# Patient Record
Sex: MALE | Race: WHITE | Employment: OTHER | RURAL
[De-identification: names, ages, dates, MRNs, and addresses within clinical notes are randomized per-mention and may not be internally consistent; named-entity substitution may affect disease eponyms.]

---

## 2018-03-22 ENCOUNTER — OFFICE VISIT (OUTPATIENT)
Dept: FAMILY MEDICINE CLINIC | Age: 28
End: 2018-03-22

## 2018-03-22 VITALS
HEART RATE: 69 BPM | DIASTOLIC BLOOD PRESSURE: 78 MMHG | HEIGHT: 68 IN | RESPIRATION RATE: 14 BRPM | TEMPERATURE: 97.7 F | OXYGEN SATURATION: 99 % | BODY MASS INDEX: 21.67 KG/M2 | SYSTOLIC BLOOD PRESSURE: 118 MMHG | WEIGHT: 143 LBS

## 2018-03-22 DIAGNOSIS — J06.9 URI WITH COUGH AND CONGESTION: Primary | ICD-10-CM

## 2018-03-22 RX ORDER — AZITHROMYCIN 250 MG/1
TABLET, FILM COATED ORAL
Qty: 6 TAB | Refills: 0 | Status: SHIPPED | OUTPATIENT
Start: 2018-03-22 | End: 2018-06-19 | Stop reason: ALTCHOICE

## 2018-03-22 NOTE — MR AVS SNAPSHOT
[facility] 
[phone] Patient: Desire Bonilla MRN: [id number] [facility] [place]:[id number]/66/0645 Visit Information Date & Time Provider Department Dept. Phone Encounter #  
 3/22/2018 10:30 AM Alexandra Gonsales PA-C [address] [id number] Follow-up Instructions Return if symptoms worsen or fail to improve. Upcoming Health Maintenance Date Due Influenza Age 5 to Adult 8/1/2017 DTaP/Tdap/Td series (2 - Td) 6/23/2023 Allergies as of 3/22/2018  Review Complete On: 3/22/2018 By: Alexandra Gonsales PA-C No Known Allergies Current Immunizations  Never Reviewed Name Date Tdap 6/23/2013 12:29 AM  
  
 Not reviewed this visit You Were Diagnosed With   
  
 Codes Comments URI with cough and congestion    -  Primary ICD-10-CM: J06.9 ICD-9-CM: 465.9 Vitals BP Pulse Temp Resp Height(growth percentile) Weight(growth percentile) 118/78 (BP 1 Location: Left arm, BP Patient Position: Sitting) 69 97.7 °F (36.5 °C) (Oral) 14 5' 8\" (1.727 m) 143 lb (64.9 kg) SpO2 BMI Smoking Status 99% 21.74 kg/m2 Never Smoker BMI and BSA Data Body Mass Index Body Surface Area 21.74 kg/m 2 1.76 m 2 Preferred Pharmacy Pharmacy Name Phone [facility] [address] [place] [address], [address] [place] [address] Your Updated Medication List  
  
   
This list is accurate as of 3/22/18 11:06 AM.  Always use your most recent med list.  
  
  
  
  
 azithromycin 250 mg tablet Commonly known as:  Lupe Rogue Take 2 tablets today, then take 1 tablet daily HYDROcodone-acetaminophen 5-500 mg per tablet Commonly known as:  Alli Gank Take 1 Tab by mouth every four (4) hours as needed for Pain. ibuprofen 200 mg tablet Commonly known as:  MOTRIN Take 400 mg by mouth. raNITIdine 150 mg tablet Commonly known as:  ZANTAC Take 150 mg by mouth as needed. ZANAFLEX 6 mg capsule Generic drug:  tiZANidine Take 6 mg by mouth three (3) times daily. Prescriptions Sent to Pharmacy Refills  
 azithromycin (ZITHROMAX) 250 mg tablet 0 Sig: Take 2 tablets today, then take 1 tablet daily Class: Normal  
 Pharmacy: [facility] [address] [place] [address] 3 & 33  #: [phone] Follow-up Instructions Return if symptoms worsen or fail to improve. Patient Instructions Upper Respiratory Infection (Cold): Care Instructions Your Care Instructions An upper respiratory infection, or URI, is an infection of the nose, sinuses, or throat. URIs are spread by coughs, sneezes, and direct contact. The common cold is the most frequent kind of URI. The flu and sinus infections are other kinds of URIs. Almost all URIs are caused by viruses. Antibiotics won't cure them. But you can treat most infections with home care. This may include drinking lots of fluids and taking over-the-counter pain medicine. You will probably feel better in 4 to 10 days. The doctor has checked you carefully, but problems can develop later. If you notice any problems or new symptoms, get medical treatment right away. Follow-up care is a key part of your treatment and safety. Be sure to make and go to all appointments, and call your doctor if you are having problems. It's also a good idea to know your test results and keep a list of the medicines you take. How can you care for yourself at home? · To prevent dehydration, drink plenty of fluids, enough so that your urine is light yellow or clear like water. Choose water and other caffeine-free clear liquids until you feel better. If you have kidney, heart, or liver disease and have to limit fluids, talk with your doctor before you increase the amount of fluids you drink. · Take an over-the-counter pain medicine, such as acetaminophen (Tylenol), ibuprofen (Advil, Motrin), or naproxen (Aleve). Read and follow all instructions on the label. · Before you use cough and cold medicines, check the label. These medicines may not be safe for young children or for people with certain health problems. · Be careful when taking over-the-counter cold or flu medicines and Tylenol at the same time. Many of these medicines have acetaminophen, which is Tylenol. Read the labels to make sure that you are not taking more than the recommended dose. Too much acetaminophen (Tylenol) can be harmful. · Get plenty of rest. 
· Do not smoke or allow others to smoke around you. If you need help quitting, talk to your doctor about stop-smoking programs and medicines. These can increase your chances of quitting for good. When should you call for help? Call 911 anytime you think you may need emergency care. For example, call if: 
? · You have severe trouble breathing. ?Call your doctor now or seek immediate medical care if: 
? · You seem to be getting much sicker. ? · You have new or worse trouble breathing. ? · You have a new or higher fever. ? · You have a new rash. ? Watch closely for changes in your health, and be sure to contact your doctor if: 
? · You have a new symptom, such as a sore throat, an earache, or sinus pain. ? · You cough more deeply or more often, especially if you notice more mucus or a change in the color of your mucus. ? · You do not get better as expected. Where can you learn more? Go to http://christiano-edgar.info/. Enter T551 in the search box to learn more about \"Upper Respiratory Infection (Cold): Care Instructions. \" Current as of: May 12, 2017 Content Version: 11.4 © 7052-3310 [employer].  Care instructions adapted under license by [employer] (which disclaims liability or warranty for this information). If you have questions about a medical condition or this instruction, always ask your healthcare professional. [address] any warranty or liability for your use of this information. Introducing hospitals & [facility] SERVICES! Elma Mariano introduces Shnergle patient portal. Now you can access parts of your medical record, email your doctor's office, and request medication refills online. 1. In your internet browser, go to [email or web address] 2. Click on the First Time User? Click Here link in the Sign In box. You will see the New Member Sign Up page. 3. Enter your Shnergle Access Code exactly as it appears below. You will not need to use this code after youve completed the sign-up process. If you do not sign up before the expiration date, you must request a new code. · Shnergle Access Code: [email or web address] Expires: 6/20/2018 11:06 AM 
 
4. Enter the last four digits of your Social Security Number (xxxx) and Date of Birth (mm/dd/yyyy) as indicated and click Submit. You will be taken to the next sign-up page. 5. Create a Shnergle ID. This will be your Shnergle login ID and cannot be changed, so think of one that is secure and easy to remember. 6. Create a Shnergle password. You can change your password at any time. 7. Enter your Password Reset Question and Answer. This can be used at a later time if you forget your password. 8. Enter your e-mail address. You will receive e-mail notification when new information is available in [address]. 9. Click Sign Up. You can now view and download portions of your medical record. 10. Click the Download Summary menu link to download a portable copy of your medical information. If you have questions, please visit the Frequently Asked Questions section of the Shnergle website. Remember, Shnergle is NOT to be used for urgent needs. For medical emergencies, dial 911. Now available from your iPhone and Android! Please provide this summary of care documentation to your next provider. Your primary care clinician is listed as Marian Le. If you have any questions after today's visit, please call [phone].

## 2018-03-22 NOTE — PROGRESS NOTES
Angelina Bravo is a 25 y.o. male who presents to the office today with the following:  Chief Complaint   Patient presents with    Sinus Infection     pt c/o head congestion and chest congestion X4 days, productive cough w/yellow mucus       HPI  Chest congestion x 4 days. \"does not feel like a cold, those don't usually get me down\"  Energy low, appetite decent. Coughing up yellow mucus, clear phlegm from nose. Heavy sinus pressure, but no pain. Does not think has sinus infection. Throat is a little sore, but w/o painful swallowing. No fever, HA, neck pain, ear pain, tooth pain, or body aches. No sick contacts or recent travel. Feeling a little better today, but worried will worse over weekend. Tried Vicks cold/flu - a little temporary relief yesterday. Review of Systems   Constitutional: Positive for malaise/fatigue. Negative for chills, diaphoresis and fever. HENT: Positive for congestion. Negative for ear pain, sinus pain and sore throat. Respiratory: Positive for cough. Negative for shortness of breath and wheezing. Cardiovascular: Negative for chest pain. Genitourinary: Negative. Musculoskeletal: Negative for myalgias. Skin: Negative for rash. Neurological: Negative for dizziness and headaches. See HPI. History reviewed. No pertinent past medical history. History reviewed. No pertinent surgical history. No Known Allergies    Current Outpatient Prescriptions   Medication Sig    azithromycin (ZITHROMAX) 250 mg tablet Take 2 tablets today, then take 1 tablet daily    ranitidine (ZANTAC) 150 mg tablet Take 150 mg by mouth as needed.  ibuprofen (MOTRIN) 200 mg tablet Take 400 mg by mouth.  tiZANidine (ZANAFLEX) 6 mg capsule Take 6 mg by mouth three (3) times daily.  HYDROcodone-acetaminophen (LORTAB) 5-500 mg per tablet Take 1 Tab by mouth every four (4) hours as needed for Pain. No current facility-administered medications for this visit.         Social History Social History    Marital status:      Spouse name: N/A    Number of children: N/A    Years of education: N/A     Social History Main Topics    Smoking status: Never Smoker    Smokeless tobacco: Never Used    Alcohol use Yes      Comment: occassional    Drug use: No    Sexual activity: Not Asked     Other Topics Concern    None     Social History Narrative       Family History   Problem Relation Age of Onset    No Known Problems Mother     No Known Problems Father     No Known Problems Sister     No Known Problems Brother     No Known Problems Sister          Physical Exam:  Visit Vitals    /78 (BP 1 Location: Left arm, BP Patient Position: Sitting)    Pulse 69    Temp 97.7 °F (36.5 °C) (Oral)    Resp 14    Ht 5' 8\" (1.727 m)    Wt 143 lb (64.9 kg)    SpO2 99%    BMI 21.74 kg/m2     Physical Exam   Constitutional: He is oriented to person, place, and time and well-developed, well-nourished, and in no distress. Vital signs are normal.   HENT:   Head: Normocephalic and atraumatic. Right Ear: External ear normal.   Left Ear: External ear normal.   Mouth/Throat: Oropharynx is clear and moist.   Eyes: Conjunctivae are normal.   Neck: Normal range of motion. Neck supple. Cardiovascular: Normal rate, regular rhythm and normal heart sounds. Pulmonary/Chest: Effort normal. No accessory muscle usage. No respiratory distress. He has no decreased breath sounds. He has no wheezes. He has no rhonchi. Very cortney crackles at bilat bases, but good air exchange, no wheeze/rhonchi or other findings. No increase effort, speaking in full sentences. Abdominal: Soft. There is no tenderness. Lymphadenopathy:     He has cervical adenopathy (few scattered ant cerv nodes, small, mobile, nonttp). Neurological: He is alert and oriented to person, place, and time. Gait normal.   Skin: Skin is warm and dry.    Psychiatric: Mood and affect normal.   Nursing note and vitals reviewed. Assessment/Plan:    ICD-10-CM ICD-9-CM    1. URI with cough and congestion J06.9 465.9 azithromycin (ZITHROMAX) 250 mg tablet     Pt feeling better, may hold a few more days then take abx if does not continue to improve. Counseled pt on potential medication AEs/interactions. Encourage rest & fluids. Discussed otc medications for symptomatic relief. RTO if sxs persist/worsen or develops any additional sxs/concerns. Seek immediate care/to ER for any \"red flag\" sxs. Follow-up Disposition:  Return if symptoms worsen or fail to improve.     Glenna Alfred PA-C

## 2018-03-22 NOTE — PATIENT INSTRUCTIONS
Upper Respiratory Infection (Cold): Care Instructions  Your Care Instructions    An upper respiratory infection, or URI, is an infection of the nose, sinuses, or throat. URIs are spread by coughs, sneezes, and direct contact. The common cold is the most frequent kind of URI. The flu and sinus infections are other kinds of URIs. Almost all URIs are caused by viruses. Antibiotics won't cure them. But you can treat most infections with home care. This may include drinking lots of fluids and taking over-the-counter pain medicine. You will probably feel better in 4 to 10 days. The doctor has checked you carefully, but problems can develop later. If you notice any problems or new symptoms, get medical treatment right away. Follow-up care is a key part of your treatment and safety. Be sure to make and go to all appointments, and call your doctor if you are having problems. It's also a good idea to know your test results and keep a list of the medicines you take. How can you care for yourself at home? · To prevent dehydration, drink plenty of fluids, enough so that your urine is light yellow or clear like water. Choose water and other caffeine-free clear liquids until you feel better. If you have kidney, heart, or liver disease and have to limit fluids, talk with your doctor before you increase the amount of fluids you drink. · Take an over-the-counter pain medicine, such as acetaminophen (Tylenol), ibuprofen (Advil, Motrin), or naproxen (Aleve). Read and follow all instructions on the label. · Before you use cough and cold medicines, check the label. These medicines may not be safe for young children or for people with certain health problems. · Be careful when taking over-the-counter cold or flu medicines and Tylenol at the same time. Many of these medicines have acetaminophen, which is Tylenol. Read the labels to make sure that you are not taking more than the recommended dose.  Too much acetaminophen (Tylenol) can be harmful. · Get plenty of rest.  · Do not smoke or allow others to smoke around you. If you need help quitting, talk to your doctor about stop-smoking programs and medicines. These can increase your chances of quitting for good. When should you call for help? Call 911 anytime you think you may need emergency care. For example, call if:  ? · You have severe trouble breathing. ?Call your doctor now or seek immediate medical care if:  ? · You seem to be getting much sicker. ? · You have new or worse trouble breathing. ? · You have a new or higher fever. ? · You have a new rash. ? Watch closely for changes in your health, and be sure to contact your doctor if:  ? · You have a new symptom, such as a sore throat, an earache, or sinus pain. ? · You cough more deeply or more often, especially if you notice more mucus or a change in the color of your mucus. ? · You do not get better as expected. Where can you learn more? Go to http://christiano-edgar.info/. Enter D931 in the search box to learn more about \"Upper Respiratory Infection (Cold): Care Instructions. \"  Current as of: May 12, 2017  Content Version: 11.4  © 2470-2293 [employer]. Care instructions adapted under license by [employer] (which disclaims liability or warranty for this information). If you have questions about a medical condition or this instruction, always ask your healthcare professional. [place] [address] any warranty or liability for your use of this information.

## 2018-03-22 NOTE — PROGRESS NOTES
Chief Complaint   Patient presents with    Sinus Infection     pt c/o head congestion and chest congestion X4 days, productive cough w/yellow mucus     Pt also fatigued, no energy    Health Maintenance Due   Topic Date Due    Influenza Age 5 to Adult  08/01/2017     Visit Vitals    /78 (BP 1 Location: Left arm, BP Patient Position: Sitting)    Pulse 69    Temp 97.7 °F (36.5 °C) (Oral)    Resp 14    Ht 5' 8\" (1.727 m)    Wt 143 lb (64.9 kg)    SpO2 99%    BMI 21.74 kg/m2     Henrietta Hamilton LPN

## 2018-06-19 ENCOUNTER — OFFICE VISIT (OUTPATIENT)
Dept: FAMILY MEDICINE CLINIC | Age: 28
End: 2018-06-19

## 2018-06-19 VITALS
SYSTOLIC BLOOD PRESSURE: 118 MMHG | RESPIRATION RATE: 16 BRPM | WEIGHT: 140.2 LBS | TEMPERATURE: 97.5 F | DIASTOLIC BLOOD PRESSURE: 75 MMHG | HEART RATE: 75 BPM | HEIGHT: 68 IN | BODY MASS INDEX: 21.25 KG/M2 | OXYGEN SATURATION: 97 %

## 2018-06-19 DIAGNOSIS — R59.0 INGUINAL LYMPHADENOPATHY: Primary | ICD-10-CM
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
## 2018-06-19 LAB
BILIRUB UR QL STRIP: NEGATIVE
GLUCOSE UR-MCNC: NEGATIVE MG/DL
KETONES P FAST UR STRIP-MCNC: NEGATIVE MG/DL
PH UR STRIP: 6 [PH] (ref 4.6–8)
PROT UR QL STRIP: NEGATIVE
SP GR UR STRIP: 1.01 (ref 1–1.03)
UA UROBILINOGEN AMB POC: NORMAL (ref 0.2–1)
URINALYSIS CLARITY POC: CLEAR
URINALYSIS COLOR POC: NORMAL
URINE BLOOD POC: NEGATIVE
URINE LEUKOCYTES POC: NEGATIVE
URINE NITRITES POC: NEGATIVE

## 2018-06-19 RX ORDER — ASCORBIC ACID 500 MG
1000 TABLET ORAL
COMMUNITY
End: 2022-08-03

## 2018-06-19 NOTE — PROGRESS NOTES
Danika López is a 25 y.o. male who presents to the office today with the following:  Chief Complaint   Patient presents with    Mass     swollen lymph node right inguinal area X3 weeks       HPI  Noticed a swollen lymph node in his right groin ~3 weeks ago. Unsure of what caused, is outside a lot and did have some tick bites on one of his LEs about 1 wk prior. Swelling has gone down since onset, but \"still there and feels a little hard\"  No longer as sore/tender. After bending a lot does aggravate right leg, ant thigh. Otherwise no injury he is aware of. Is not sure of any other cause, no rash/wounds/sxs of infection. Has had no recent illness, no n/v/d/abd pain or other GI sxs, also denies any  sxs. Is  in monogamous relationship and has no concerns of any STDs. Says this happened to upper back twice before, had swollen lymph nodes all around neck/armpit after infected tick bite to that area, and again after working with [employer] before (says his lymph nodes right neck have remained feeling a little \"stiff\" since those flare ups vs left). Pt notes this also occurred in inguinal region when he was younger, thought just puberty. Things resolved their own. Otherwise feeling well with no other complaints or acute concerns. Denies any systemic illness sxs. No other glands feel swollen. Review of Systems   Constitutional: Negative for chills, diaphoresis, fever, malaise/fatigue and weight loss. HENT: Negative. Eyes: Negative. Respiratory: Negative for cough and shortness of breath. Cardiovascular: Negative for chest pain. Gastrointestinal: Negative. Negative for abdominal pain, blood in stool, constipation, diarrhea, melena, nausea and vomiting. Genitourinary: Negative. Musculoskeletal: Negative for back pain, joint pain and myalgias. Skin: Negative for itching and rash. Neurological: Negative. Negative for dizziness, weakness and headaches. See HPI.     History reviewed. No pertinent past medical history. History reviewed. No pertinent surgical history. No Known Allergies    Current Outpatient Prescriptions   Medication Sig    ascorbic acid, vitamin C, (VITAMIN C) 500 mg tablet Take 1,000 mg by mouth.  ibuprofen (MOTRIN) 200 mg tablet Take 400 mg by mouth.  ranitidine (ZANTAC) 150 mg tablet Take 150 mg by mouth as needed.  tiZANidine (ZANAFLEX) 6 mg capsule Take 6 mg by mouth three (3) times daily.  HYDROcodone-acetaminophen (LORTAB) 5-500 mg per tablet Take 1 Tab by mouth every four (4) hours as needed for Pain. No current facility-administered medications for this visit. Social History     Social History    Marital status:      Spouse name: N/A    Number of children: N/A    Years of education: N/A     Social History Main Topics    Smoking status: Never Smoker    Smokeless tobacco: Never Used    Alcohol use Yes      Comment: occassional    Drug use: No    Sexual activity: Yes     Partners: Female     Birth control/ protection: None, Pill     Other Topics Concern    None     Social History Narrative       Family History   Problem Relation Age of Onset    No Known Problems Mother     No Known Problems Father     No Known Problems Sister     No Known Problems Brother     No Known Problems Sister          Physical Exam:  Visit Vitals    /75 (BP 1 Location: Left arm, BP Patient Position: Sitting)    Pulse 75    Temp 97.5 °F (36.4 °C) (Oral)    Resp 16    Ht 5' 8\" (1.727 m)    Wt 140 lb 3.2 oz (63.6 kg)    SpO2 97%    BMI 21.32 kg/m2     Physical Exam   Constitutional: He is oriented to person, place, and time and well-developed, well-nourished, and in no distress. Thin WM. HENT:   Head: Normocephalic and atraumatic. Right Ear: External ear normal.   Left Ear: External ear normal.   Eyes: Conjunctivae and EOM are normal.   Neck: Normal range of motion. Neck supple.    Cardiovascular: Normal rate, regular rhythm, normal heart sounds and intact distal pulses. Pulmonary/Chest: Effort normal and breath sounds normal.   Abdominal: Soft. He exhibits no distension and no mass. There is no tenderness. There is no rebound and no guarding. Genitourinary: Testes/scrotum normal and penis normal. No discharge found. Musculoskeletal: Normal range of motion. He exhibits no edema, tenderness or deformity. Lymphadenopathy:     He has no cervical adenopathy. He has no axillary adenopathy. Right: Inguinal (upper right region ~2cm, soft mobile and nonttp. left inguinal nodes are also easily palpable but do not feel enlarged and are mobile and nontt, <1cm) adenopathy present. No supraclavicular and no epitrochlear adenopathy present. Left: No inguinal, no supraclavicular and no epitrochlear adenopathy present. No head adenopathy. Neurological: He is alert and oriented to person, place, and time. Gait normal.   Skin: Skin is warm and dry. No rash noted. Psychiatric: Mood and affect normal.   Nursing note and vitals reviewed. Assessment/Plan:    ICD-10-CM ICD-9-CM    1. Inguinal lymphadenopathy R59.0 785.6 AMB POC URINALYSIS DIP STICK MANUAL W/O MICRO   2. Tick bite, initial encounter W57. XXXA 919.4 CBC WITH AUTOMATED DIFF     E906.4 LYME AB/WESTERN BLOT REFLEX     Results for orders placed or performed in visit on 06/19/18   AMB POC URINALYSIS DIP STICK MANUAL W/O MICRO   Result Value Ref Range    Color (UA POC) Light Yellow     Clarity (UA POC) Clear     Glucose (UA POC) Negative Negative    Bilirubin (UA POC) Negative Negative    Ketones (UA POC) Negative Negative    Specific gravity (UA POC) 1.010 1.001 - 1.035    Blood (UA POC) Negative Negative    pH (UA POC) 6.0 4.6 - 8.0    Protein (UA POC) Negative Negative    Urobilinogen (UA POC) 0.2 mg/dL 0.2 - 1    Nitrites (UA POC) Negative Negative    Leukocyte esterase (UA POC) Negative Negative     Discussed various potential etiologies.  Work-up pending. Pt notes improvement already, he will cont to monitor his sxs and f/u in 2 wks for recheck. If no improvement/worsening may proceed with some imaging, pt agrees would like to hold off for now and see results/monitor first. Will seek care/rtc in interim for any new or worsening sxs. Follow-up Disposition:  Return in about 2 weeks (around 7/3/2018), or sooner if symptoms worsen or dev new sxs.     Angel Luis Peters PA-C

## 2018-06-19 NOTE — PROGRESS NOTES
Chief Complaint   Patient presents with    Mass     swollen lymph node right inguinal area X3 weeks     There are no preventive care reminders to display for this patient. Visit Vitals    /75 (BP 1 Location: Left arm, BP Patient Position: Sitting)    Pulse 75    Temp 97.5 °F (36.4 °C) (Oral)    Resp 16    Ht 5' 8\" (1.727 m)    Wt 140 lb 3.2 oz (63.6 kg)    SpO2 97%    BMI 21.32 kg/m2     1. Have you been to the ER, urgent care clinic since your last visit? Hospitalized since your last visit? No    2. Have you seen or consulted any other health care providers outside of the [address] [place] [address] since your last visit? Include any pap smears or colon screening.  No    Zahraa Grewal LPN

## 2018-06-19 NOTE — PATIENT INSTRUCTIONS
Swollen Lymph Nodes: Care Instructions  Your Care Instructions    Lymph nodes are small, bean-shaped glands throughout the body. They help your body fight germs and infections. Lymph nodes often swell when there is a problem such as an injury, infection, or tumor. · The nodes in your neck, under your chin, or behind your ears may swell when you have a cold or sore throat. · An injury or infection in a leg or foot can make the nodes in your groin swell. · Sometimes medicine can make lymph nodes swell, but this is rare. Treatment depends on what caused your nodes to swell. Usually the nodes return to normal size without a problem. Follow-up care is a key part of your treatment and safety. Be sure to make and go to all appointments, and call your doctor if you are having problems. It's also a good idea to know your test results and keep a list of the medicines you take. How can you care for yourself at home? · Take your medicines exactly as prescribed. Call your doctor if you think you are having a problem with your medicine. · Avoid irritation. ¨ Do not squeeze or pick at the lump. ¨ Do not stick a needle in it. · Prevent infection. Do not squeeze, drain, or puncture a painful lump. Doing this can irritate or inflame the lump, push any existing infection deeper into the skin, or cause severe bleeding. · Get extra rest. Slow down just a little from your usual routine. · Drink plenty of fluids, enough so that your urine is light yellow or clear like water. If you have kidney, heart, or liver disease and have to limit fluids, talk with your doctor before you increase the amount of fluids you drink. · Take an over-the-counter pain medicine, such as acetaminophen (Tylenol), ibuprofen (Advil, Motrin), or naproxen (Aleve). Read and follow all instructions on the label. · Do not take two or more pain medicines at the same time unless the doctor told you to.  Many pain medicines have acetaminophen, which is Tylenol. Too much acetaminophen (Tylenol) can be harmful. When should you call for help? Call your doctor now or seek immediate medical care if:  ? · You have worse symptoms of infection, such as:  ¨ Increased pain, swelling, warmth, or redness. ¨ Red streaks leading from the area. ¨ Pus draining from the area. ¨ A fever. ? Watch closely for changes in your health, and be sure to contact your doctor if:  ? · Your lymph nodes do not get smaller or do not return to normal.   ? · You do not get better as expected. Where can you learn more? Go to http://christiano-edgar.info/. Enter I608 in the search box to learn more about \"Swollen Lymph Nodes: Care Instructions. \"  Current as of: March 3, 2017  Content Version: 11.4  © 2274-0978 [employer]. Care instructions adapted under license by [employer] (which disclaims liability or warranty for this information). If you have questions about a medical condition or this instruction, always ask your healthcare professional. [place] [address] any warranty or liability for your use of this information. Tick Bite: Care Instructions  Your Care Instructions    Ticks are small spiderlike animals. They bite to fasten themselves onto your skin and feed on your blood. Ticks can carry diseases. But most ticks do not carry diseases, and most tick bites do not cause serious health problems. Some people may have an allergic reaction to a tick bite. This reaction may be mild, with symptoms like itching and swelling. In rare cases, a severe allergic reaction may occur. Most of the time, all you need to do for a tick bite is relieve any symptoms you may have. Follow-up care is a key part of your treatment and safety. Be sure to make and go to all appointments, and call your doctor if you are having problems. It's also a good idea to know your test results and keep a list of the medicines you take.   How can you care for yourself at home? · Put ice or a cold pack on the bite for 15 to 20 minutes once an hour. Put a thin cloth between the ice and your skin. · Try an over-the-counter medicine to relieve itching, redness, swelling, and pain. Be safe with medicines. Read and follow all instructions on the label. ¨ Take an antihistamine medicine, such as a nondrowsy one like loratadine (Claritin) or one that might make you sleepy like diphenhydramine (Benadryl). These medicines may help relieve itching, redness, and swelling. ¨ Use a spray of local anesthetic that contains benzocaine, such as Solarcaine. It may help relieve pain. If your skin reacts to the spray, stop using it. ¨ Put calamine lotion on the skin. It may help relieve itching. To avoid tick bites  · Avoid ticks:  ¨ Learn where ticks are found in your community, and stay away from those areas if possible. ¨ Cover as much of your body as possible when you work or play in grassy or wooded areas. ¨ Use insect repellents, such as products containing DEET. You can spray them on your skin. ¨ Take steps to control ticks on your property if you live in an area where Lyme disease occurs. Clear leaves, brush, tall grasses, woodpiles, and stone fences from around your house and the edges of your yard or garden. This may help get rid of ticks. · When you come in from outdoors, check your body for ticks, including your groin, head, and underarms. The ticks may be about the size of a sesame seed. If no one else can help you check for ticks on your scalp, comb your hair with a fine-tooth comb. · If you find a tick, remove it quickly. Use tweezers to grasp the tick as close to its mouth (the part in your skin) as possible. Slowly pull the tick straight out-do not twist or yank-until its mouth releases from your skin. · Ticks can come into your house on clothing, outdoor gear, and pets. These ticks can fall off and attach to you. ¨ Check your clothing and outdoor gear.  Remove any ticks you find. Then put your clothing in a clothes dryer on high heat for 1 hour to kill any ticks that might remain. ¨ Check your pets for ticks after they have been outdoors. · When hiking in the woods, carry a small dry jar or ziplock bag. If you find a tick on your body, remove the tick and put it in the jar or bag. Store the container in the freezer so you can give it to your doctor if symptoms develop. The tick can be tested to learn whether it is carrying the bacteria that cause Lyme disease. When should you call for help? Call 911 anytime you think you may need emergency care. For example, call if:  ? · You have symptoms of a severe allergic reaction. These may include:  ¨ Sudden raised, red areas (hives) all over your body. ¨ Swelling of the throat, mouth, lips, or tongue. ¨ Trouble breathing. ¨ Passing out (losing consciousness). Or you may feel very lightheaded or suddenly feel weak, confused, or restless. ?Call your doctor now or seek immediate medical care if:  ? · You have signs of infection, such as:  ¨ Increased pain, swelling, warmth, or redness around the bite. ¨ Red streaks leading from the bite. ¨ Pus draining from the bite. ¨ A fever. ? Watch closely for changes in your health, and be sure to contact your doctor if:  ? · You develop a new rash. ? · You have joint pain. ? · You are very tired. ? · You have flu-like symptoms. ? · You have symptoms for more than 1 week. Where can you learn more? Go to http://christiano-edgar.info/. Enter R455 in the search box to learn more about \"Tick Bite: Care Instructions. \"  Current as of: March 20, 2017  Content Version: 11.4  © 0950-5519 [employer]. Care instructions adapted under license by [employer] (which disclaims liability or warranty for this information).  If you have questions about a medical condition or this instruction, always ask your healthcare professional. [employer] disclaims any warranty or liability for your use of this information.

## 2018-06-20 LAB
B BURGDOR IGG+IGM SER-ACNC: <0.91 ISR (ref 0–0.9)
B BURGDOR IGM SER IA-ACNC: <0.8 INDEX (ref 0–0.79)
BASOPHILS # BLD AUTO: 0 X10E3/UL (ref 0–0.2)
BASOPHILS NFR BLD AUTO: 1 %
EOSINOPHIL # BLD AUTO: 0.4 X10E3/UL (ref 0–0.4)
EOSINOPHIL NFR BLD AUTO: 9 %
ERYTHROCYTE [DISTWIDTH] IN BLOOD BY AUTOMATED COUNT: 13.9 % (ref 12.3–15.4)
HCT VFR BLD AUTO: 44.4 % (ref 37.5–51)
HGB BLD-MCNC: 15.1 G/DL (ref 13–17.7)
IMM GRANULOCYTES # BLD: 0 X10E3/UL (ref 0–0.1)
IMM GRANULOCYTES NFR BLD: 0 %
LYMPHOCYTES # BLD AUTO: 1.3 X10E3/UL (ref 0.7–3.1)
LYMPHOCYTES NFR BLD AUTO: 31 %
MCH RBC QN AUTO: 30.4 PG (ref 26.6–33)
MCHC RBC AUTO-ENTMCNC: 34 G/DL (ref 31.5–35.7)
MCV RBC AUTO: 90 FL (ref 79–97)
MONOCYTES # BLD AUTO: 0.3 X10E3/UL (ref 0.1–0.9)
MONOCYTES NFR BLD AUTO: 7 %
NEUTROPHILS # BLD AUTO: 2.2 X10E3/UL (ref 1.4–7)
NEUTROPHILS NFR BLD AUTO: 52 %
PLATELET # BLD AUTO: 252 X10E3/UL (ref 150–379)
RBC # BLD AUTO: 4.96 X10E6/UL (ref 4.14–5.8)
WBC # BLD AUTO: 4.3 X10E3/UL (ref 3.4–10.8)

## 2018-07-06 ENCOUNTER — OFFICE VISIT (OUTPATIENT)
Dept: FAMILY MEDICINE CLINIC | Age: 28
End: 2018-07-06

## 2018-07-06 VITALS
SYSTOLIC BLOOD PRESSURE: 131 MMHG | HEIGHT: 68 IN | BODY MASS INDEX: 20.34 KG/M2 | TEMPERATURE: 97.7 F | HEART RATE: 69 BPM | OXYGEN SATURATION: 97 % | DIASTOLIC BLOOD PRESSURE: 73 MMHG | RESPIRATION RATE: 14 BRPM | WEIGHT: 134.2 LBS

## 2018-07-06 DIAGNOSIS — R59.0 INGUINAL LYMPHADENOPATHY: Primary | ICD-10-CM

## 2018-07-06 NOTE — PROGRESS NOTES
Chief Complaint   Patient presents with    Mass     2 wk f/u swollen gland     There are no preventive care reminders to display for this patient. Visit Vitals    /73 (BP 1 Location: Left arm, BP Patient Position: Sitting)    Pulse 69    Temp 97.7 °F (36.5 °C) (Oral)    Resp 14    Ht 5' 8\" (1.727 m)    Wt 134 lb 3.2 oz (60.9 kg)    SpO2 97%    BMI 20.41 kg/m2     1. Have you been to the ER, urgent care clinic since your last visit? Hospitalized since your last visit? No    2. Have you seen or consulted any other health care providers outside of the [address] [place] [address] since your last visit? Include any pap smears or colon screening.  No    Shakira LOVE Pfeiffer

## 2018-07-06 NOTE — PROGRESS NOTES
Sagrario Poole is a 25 y.o. male who presents to the office today with the following:  Chief Complaint   Patient presents with    Mass     2 wk f/u swollen gland       HPI  Here for f/u inguinal lymphadenopathy. Says is no longer tender. Has gone down in size, but still feels \"longer\" than other nodes. Has not had any new sxs or noticed any other swollen glands or masses. Otherwise feeling well with no other complaints or acute concerns. ROS  See HPI. Past Medical History:   Diagnosis Date    Inguinal adenopathy        History reviewed. No pertinent surgical history. No Known Allergies    Current Outpatient Prescriptions   Medication Sig    ascorbic acid, vitamin C, (VITAMIN C) 500 mg tablet Take 1,000 mg by mouth.  ibuprofen (MOTRIN) 200 mg tablet Take 400 mg by mouth.  ranitidine (ZANTAC) 150 mg tablet Take 150 mg by mouth as needed.  tiZANidine (ZANAFLEX) 6 mg capsule Take 6 mg by mouth three (3) times daily.  HYDROcodone-acetaminophen (LORTAB) 5-500 mg per tablet Take 1 Tab by mouth every four (4) hours as needed for Pain. No current facility-administered medications for this visit.         Social History     Social History    Marital status:      Spouse name: N/A    Number of children: N/A    Years of education: N/A     Social History Main Topics    Smoking status: Never Smoker    Smokeless tobacco: Never Used    Alcohol use Yes      Comment: occassional    Drug use: No    Sexual activity: Yes     Partners: Female     Birth control/ protection: None, Pill     Other Topics Concern    None     Social History Narrative       Family History   Problem Relation Age of Onset    No Known Problems Mother     No Known Problems Father     No Known Problems Sister     No Known Problems Brother     No Known Problems Sister          Physical Exam:  Visit Vitals    /73 (BP 1 Location: Left arm, BP Patient Position: Sitting)    Pulse 69    Temp 97.7 °F (36.5 °C) (Oral)    Resp 14    Ht 5' 8\" (1.727 m)    Wt 134 lb 3.2 oz (60.9 kg)    SpO2 97%    BMI 20.41 kg/m2     Physical Exam   Constitutional: He is oriented to person, place, and time and well-developed, well-nourished, and in no distress. HENT:   Head: Normocephalic and atraumatic. Eyes: Conjunctivae are normal.   Neck: Normal range of motion. Cardiovascular: Normal rate, regular rhythm and normal heart sounds. Pulmonary/Chest: Effort normal and breath sounds normal.   Abdominal: Soft. He exhibits no distension and no mass. There is no tenderness. There is no rebound and no guarding. Musculoskeletal: Normal range of motion. He exhibits no edema. Lymphadenopathy:     He has no cervical adenopathy. He has no axillary adenopathy. Right: No supraclavicular and no epitrochlear adenopathy present. Left: No supraclavicular and no epitrochlear adenopathy present. Right inguinal still with slightly enlarged node that seems to have gone down in size, is nonttp, mobile, soft. A few also palpable on left inguinal that are nl in size also soft, mobile, and nonttp. Neurological: He is alert and oriented to person, place, and time. Gait normal.   Skin: Skin is warm and dry. Psychiatric: Mood and affect normal.   Nursing note and vitals reviewed. Assessment/Plan:    ICD-10-CM ICD-9-CM    1. Inguinal lymphadenopathy R59.0 785. 6      Pt notes improvement. No obvious cause. Will cont to monitor to make sure continues to go down in size. rec US for further eval if does not go back to nl or worsens/dev any other sxs rtc/seek care. Pt verbalizes understanding and agrees with the plan. Follow-up Disposition:  Return if symptoms worsen or fail to improve.     Hussein Olivo PA-C

## 2022-08-03 ENCOUNTER — HOSPITAL ENCOUNTER (EMERGENCY)
Age: 32
Discharge: HOME | End: 2022-08-03
Attending: STUDENT IN AN ORGANIZED HEALTH CARE EDUCATION/TRAINING PROGRAM | Admitting: STUDENT IN AN ORGANIZED HEALTH CARE EDUCATION/TRAINING PROGRAM

## 2022-08-03 ENCOUNTER — APPOINTMENT (OUTPATIENT)
Dept: CT IMAGING | Age: 32
End: 2022-08-03
Attending: STUDENT IN AN ORGANIZED HEALTH CARE EDUCATION/TRAINING PROGRAM

## 2022-08-03 VITALS
HEIGHT: 69 IN | SYSTOLIC BLOOD PRESSURE: 121 MMHG | OXYGEN SATURATION: 98 % | WEIGHT: 131.84 LBS | BODY MASS INDEX: 19.53 KG/M2 | HEART RATE: 84 BPM | TEMPERATURE: 98.5 F | RESPIRATION RATE: 19 BRPM | DIASTOLIC BLOOD PRESSURE: 76 MMHG

## 2022-08-03 DIAGNOSIS — A09 INFECTIOUS COLITIS: Primary | ICD-10-CM

## 2022-08-03 LAB
ALBUMIN SERPL-MCNC: 3.8 G/DL (ref 3.5–5)
ALBUMIN/GLOB SERPL: 0.9 {RATIO} (ref 1.1–2.2)
ALP SERPL-CCNC: 55 U/L (ref 45–117)
ALT SERPL-CCNC: 32 U/L (ref 12–78)
ANION GAP SERPL CALC-SCNC: 7 MMOL/L (ref 5–15)
APPEARANCE UR: CLEAR
AST SERPL-CCNC: 22 U/L (ref 15–37)
BACTERIA URNS QL MICRO: NEGATIVE /HPF
BILIRUB SERPL-MCNC: 0.5 MG/DL (ref 0.2–1)
BILIRUB UR QL: NEGATIVE
BUN SERPL-MCNC: 8 MG/DL (ref 6–20)
BUN/CREAT SERPL: 8 (ref 12–20)
CALCIUM SERPL-MCNC: 9.6 MG/DL (ref 8.5–10.1)
CHLORIDE SERPL-SCNC: 102 MMOL/L (ref 97–108)
CO2 SERPL-SCNC: 25 MMOL/L (ref 21–32)
COLOR UR: ABNORMAL
CREAT SERPL-MCNC: 1.02 MG/DL (ref 0.7–1.3)
EPITH CASTS URNS QL MICRO: ABNORMAL /LPF
ERYTHROCYTE [DISTWIDTH] IN BLOOD BY AUTOMATED COUNT: 12.7 % (ref 11.5–14.5)
GLOBULIN SER CALC-MCNC: 4.4 G/DL (ref 2–4)
GLUCOSE SERPL-MCNC: 100 MG/DL (ref 65–100)
GLUCOSE UR STRIP.AUTO-MCNC: NEGATIVE MG/DL
HCT VFR BLD AUTO: 46.3 % (ref 36.6–50.3)
HGB BLD-MCNC: 16 G/DL (ref 12.1–17)
HGB UR QL STRIP: NEGATIVE
HYALINE CASTS URNS QL MICRO: ABNORMAL /LPF (ref 0–2)
KETONES UR QL STRIP.AUTO: 15 MG/DL
LEUKOCYTE ESTERASE UR QL STRIP.AUTO: NEGATIVE
LIPASE SERPL-CCNC: 79 U/L (ref 73–393)
MCH RBC QN AUTO: 31.3 PG (ref 26–34)
MCHC RBC AUTO-ENTMCNC: 34.6 G/DL (ref 30–36.5)
MCV RBC AUTO: 90.6 FL (ref 80–99)
NITRITE UR QL STRIP.AUTO: NEGATIVE
NRBC # BLD: 0 K/UL (ref 0–0.01)
NRBC BLD-RTO: 0 PER 100 WBC
PH UR STRIP: 5.5 [PH] (ref 5–8)
PLATELET # BLD AUTO: 224 K/UL (ref 150–400)
PMV BLD AUTO: 10.6 FL (ref 8.9–12.9)
POTASSIUM SERPL-SCNC: 4 MMOL/L (ref 3.5–5.1)
PROT SERPL-MCNC: 8.2 G/DL (ref 6.4–8.2)
PROT UR STRIP-MCNC: ABNORMAL MG/DL
RBC # BLD AUTO: 5.11 M/UL (ref 4.1–5.7)
RBC #/AREA URNS HPF: ABNORMAL /HPF (ref 0–5)
SODIUM SERPL-SCNC: 134 MMOL/L (ref 136–145)
SP GR UR REFRACTOMETRY: 1.02
UA: UC IF INDICATED,UAUC: ABNORMAL
UROBILINOGEN UR QL STRIP.AUTO: 0.2 EU/DL (ref 0.2–1)
WBC # BLD AUTO: 11.9 K/UL (ref 4.1–11.1)
WBC URNS QL MICRO: ABNORMAL /HPF (ref 0–4)

## 2022-08-03 PROCEDURE — 36415 COLL VENOUS BLD VENIPUNCTURE: CPT

## 2022-08-03 PROCEDURE — 83690 ASSAY OF LIPASE: CPT

## 2022-08-03 PROCEDURE — 96375 TX/PRO/DX INJ NEW DRUG ADDON: CPT

## 2022-08-03 PROCEDURE — 80053 COMPREHEN METABOLIC PANEL: CPT

## 2022-08-03 PROCEDURE — 99285 EMERGENCY DEPT VISIT HI MDM: CPT

## 2022-08-03 PROCEDURE — 74011000636 HC RX REV CODE- 636: Performed by: STUDENT IN AN ORGANIZED HEALTH CARE EDUCATION/TRAINING PROGRAM

## 2022-08-03 PROCEDURE — 96374 THER/PROPH/DIAG INJ IV PUSH: CPT

## 2022-08-03 PROCEDURE — 85027 COMPLETE CBC AUTOMATED: CPT

## 2022-08-03 PROCEDURE — 74177 CT ABD & PELVIS W/CONTRAST: CPT

## 2022-08-03 PROCEDURE — 74011250636 HC RX REV CODE- 250/636: Performed by: STUDENT IN AN ORGANIZED HEALTH CARE EDUCATION/TRAINING PROGRAM

## 2022-08-03 PROCEDURE — 81001 URINALYSIS AUTO W/SCOPE: CPT

## 2022-08-03 RX ORDER — MORPHINE SULFATE 2 MG/ML
4 INJECTION, SOLUTION INTRAMUSCULAR; INTRAVENOUS ONCE
Status: COMPLETED | OUTPATIENT
Start: 2022-08-03 | End: 2022-08-03

## 2022-08-03 RX ORDER — ONDANSETRON 2 MG/ML
4 INJECTION INTRAMUSCULAR; INTRAVENOUS ONCE
Status: COMPLETED | OUTPATIENT
Start: 2022-08-03 | End: 2022-08-03

## 2022-08-03 RX ORDER — CIPROFLOXACIN 500 MG/1
500 TABLET ORAL 2 TIMES DAILY
Qty: 10 TABLET | Refills: 0 | Status: SHIPPED | OUTPATIENT
Start: 2022-08-03 | End: 2022-08-08

## 2022-08-03 RX ADMIN — ONDANSETRON 4 MG: 2 INJECTION INTRAMUSCULAR; INTRAVENOUS at 12:30

## 2022-08-03 RX ADMIN — IOPAMIDOL 100 ML: 755 INJECTION, SOLUTION INTRAVENOUS at 12:51

## 2022-08-03 RX ADMIN — MORPHINE SULFATE 4 MG: 2 INJECTION, SOLUTION INTRAMUSCULAR; INTRAVENOUS at 12:33

## 2022-08-03 NOTE — ED PROVIDER NOTES
EMERGENCY DEPARTMENT HISTORY AND PHYSICAL EXAM      Date: 8/3/2022  Patient Name: Rahul Espinal    History of Presenting Illness     Chief Complaint   Patient presents with    Abdominal Pain     Reports new onset of generalized abdominal cramps, diarrhea, and vomiting starting last night. Reports dark stools, denied hx of GI bleed or ulcers. Not taking blood thinners. History Provided By: Patient    Rahul Espinal, 27 y.o. male     No past medical history presenting with concerns of right lower quadrant abdominal tenderness. Patient states that began last night, started in periumbilical region, down to right lower quadrant, now most present in that position, no other radiation, states that he has not eaten anything today due to anorexia and nausea, had multiple episodes of loose dark stools last night, no history of GI bleed, no surgeries in the past.  Patient also had episode of vomiting last night. Has not taken anything today for pain or nausea, also endorses T-max 101 last night oral, no temperature today. Denies any sick contacts, has had no shortness of breath, URI symptoms, social alcohol and marijuana use, denies tobacco, no illicit drugs. There are no other complaints, changes, or physical findings at this time. PCP: Lizeth Lowry MD    No current facility-administered medications on file prior to encounter. Current Outpatient Medications on File Prior to Encounter   Medication Sig Dispense Refill    [DISCONTINUED] azithromycin (ZITHROMAX) 250 mg tablet Take 2 tablets today, then take 1 tablet daily 6 Tab 0    [DISCONTINUED] ascorbic acid, vitamin C, (VITAMIN C) 500 mg tablet Take 1,000 mg by mouth. [DISCONTINUED] ibuprofen (MOTRIN) 200 mg tablet Take 400 mg by mouth. [DISCONTINUED] ranitidine (ZANTAC) 150 mg tablet Take 150 mg by mouth as needed.          Past History     Past Medical History:  Past Medical History:   Diagnosis Date    Inguinal adenopathy        Past Surgical History:  History reviewed. No pertinent surgical history. Family History:  Family History   Problem Relation Age of Onset    No Known Problems Mother     No Known Problems Father     No Known Problems Sister     No Known Problems Brother     No Known Problems Sister        Social History:  Social History     Tobacco Use    Smoking status: Never    Smokeless tobacco: Never   Substance Use Topics    Alcohol use: Yes     Comment: occassional    Drug use: No       Allergies:  No Known Allergies      Review of Systems   Review of Systems   Constitutional:  Positive for chills, fatigue and fever. Negative for activity change. HENT:  Negative for congestion and sneezing. Respiratory:  Negative for cough and shortness of breath. Cardiovascular:  Negative for chest pain and leg swelling. Gastrointestinal:  Positive for abdominal pain, diarrhea, nausea and vomiting. Negative for constipation. Genitourinary:  Negative for decreased urine volume, dysuria and frequency. Musculoskeletal:  Negative for arthralgias and myalgias. Skin:  Negative for rash. Allergic/Immunologic: Negative for immunocompromised state. Neurological:  Negative for headaches. Hematological:  Does not bruise/bleed easily. Psychiatric/Behavioral:  Negative for confusion. Physical Exam   Physical Exam  Vitals reviewed. Constitutional:       General: He is not in acute distress. Appearance: He is not ill-appearing, toxic-appearing or diaphoretic. HENT:      Head: Normocephalic and atraumatic. Mouth/Throat:      Mouth: Mucous membranes are moist.   Cardiovascular:      Rate and Rhythm: Normal rate. Pulmonary:      Effort: Pulmonary effort is normal. No tachypnea, accessory muscle usage or respiratory distress. Abdominal:      General: Abdomen is flat. Palpations: Abdomen is soft. Tenderness: There is abdominal tenderness in the right lower quadrant and periumbilical area.  There is no guarding or rebound. Comments: No Cva ttp   Musculoskeletal:      Cervical back: Neck supple. Right lower leg: No edema. Left lower leg: No edema. Skin:     General: Skin is warm and dry. Neurological:      General: No focal deficit present. Mental Status: He is alert. Psychiatric:         Mood and Affect: Mood normal.       Diagnostic Study Results     Labs -     Recent Results (from the past 24 hour(s))   CBC W/O DIFF    Collection Time: 08/03/22 11:23 AM   Result Value Ref Range    WBC 11.9 (H) 4.1 - 11.1 K/uL    RBC 5.11 4.10 - 5.70 M/uL    HGB 16.0 12.1 - 17.0 g/dL    HCT 46.3 36.6 - 50.3 %    MCV 90.6 80.0 - 99.0 FL    MCH 31.3 26.0 - 34.0 PG    MCHC 34.6 30.0 - 36.5 g/dL    RDW 12.7 11.5 - 14.5 %    PLATELET 294 013 - 579 K/uL    MPV 10.6 8.9 - 12.9 FL    NRBC 0.0 0  WBC    ABSOLUTE NRBC 0.00 0.00 - 1.37 K/uL   METABOLIC PANEL, COMPREHENSIVE    Collection Time: 08/03/22 11:23 AM   Result Value Ref Range    Sodium 134 (L) 136 - 145 mmol/L    Potassium 4.0 3.5 - 5.1 mmol/L    Chloride 102 97 - 108 mmol/L    CO2 25 21 - 32 mmol/L    Anion gap 7 5 - 15 mmol/L    Glucose 100 65 - 100 mg/dL    BUN 8 6 - 20 MG/DL    Creatinine 1.02 0.70 - 1.30 MG/DL    BUN/Creatinine ratio 8 (L) 12 - 20      GFR est AA >60 >60 ml/min/1.73m2    GFR est non-AA >60 >60 ml/min/1.73m2    Calcium 9.6 8.5 - 10.1 MG/DL    Bilirubin, total 0.5 0.2 - 1.0 MG/DL    ALT (SGPT) 32 12 - 78 U/L    AST (SGOT) 22 15 - 37 U/L    Alk.  phosphatase 55 45 - 117 U/L    Protein, total 8.2 6.4 - 8.2 g/dL    Albumin 3.8 3.5 - 5.0 g/dL    Globulin 4.4 (H) 2.0 - 4.0 g/dL    A-G Ratio 0.9 (L) 1.1 - 2.2     LIPASE    Collection Time: 08/03/22 11:23 AM   Result Value Ref Range    Lipase 79 73 - 393 U/L   URINALYSIS W/ REFLEX CULTURE    Collection Time: 08/03/22 11:23 AM    Specimen: Urine   Result Value Ref Range    Color YELLOW/STRAW      Appearance CLEAR CLEAR      Specific gravity 1.019      pH (UA) 5.5 5.0 - 8.0      Protein TRACE (A) NEG mg/dL    Glucose Negative NEG mg/dL    Ketone 15 (A) NEG mg/dL    Bilirubin Negative NEG      Blood Negative NEG      Urobilinogen 0.2 0.2 - 1.0 EU/dL    Nitrites Negative NEG      Leukocyte Esterase Negative NEG      UA:UC IF INDICATED CULTURE NOT INDICATED BY UA RESULT CNI      WBC 0-4 0 - 4 /hpf    RBC 0-5 0 - 5 /hpf    Epithelial cells FEW FEW /lpf    Bacteria Negative NEG /hpf    Hyaline cast 0-2 0 - 2 /lpf       Radiologic Studies -   CT ABD PELV W CONT   Final Result   Diffuse nonspecific colitis. Normal appendix. CT Results  (Last 48 hours)                 08/03/22 1251  CT ABD PELV W CONT Final result    Impression:  Diffuse nonspecific colitis. Normal appendix. Narrative:  INDICATION: RLQ pain, rule out appy        EXAM: CT Abdomen and Pelvis is performed with 100 mL Isovue 370 contrast IV   without oral contrast. CT dose reduction was achieved through use of a   standardized protocol tailored for this examination and automatic exposure   control for dose modulation. FINDINGS:   There is relatively diffuse colonic mural thickening compatible with colitis,   nonspecific. Appendix and small bowel are normal. Bowels are not dilated. There is no ascites, pneumoperitoneum or significant adenopathy. Liver shows no   significant finding. Bile ducts are not enlarged. Pancreas shows no mass or   inflammation. Spleen is unremarkable. Adrenal glands are normal in size. Kidneys   show no mass or hydronephrosis. Aorta is without aneurysm. The bladder is unremarkable. The distal ureters are not dilated. There is no   apparent pelvic mass. CXR Results  (Last 48 hours)      None              Medical Decision Making   I am the first provider for this patient. I reviewed the vital signs, available nursing notes, past medical history, past surgical history, family history and social history. Vital Signs-Reviewed the patient's vital signs.   Patient Vitals for the past 12 hrs:   Temp Pulse Resp BP SpO2   08/03/22 1230 -- -- -- 121/76 98 %   08/03/22 1115 98.5 °F (36.9 °C) 84 19 (!) 146/67 99 %       Records Reviewed: Nursing records and medical records reviewed    Ddx: Appendicitis, gastroenteritis, amatory bowel disease    Initial assessment performed. The patients presenting problems have been discussed, and they are in agreement with the care plan formulated and outlined with them. I have encouraged them to ask questions as they arise throughout their visit. MDM  Number of Diagnoses or Management Options  Infectious colitis  Diagnosis management comments: Patient with no significant medical history presenting with right lower quadrant abdominal pain, vomiting, nausea, anorexia, loose stools, patient without abdominal history or surgeries, concern for possible appendicitis gastroenteritis, will obtain basic lab work CT abdomen with contrast to evaluate further.   Provide pain medication and antiemetics and reevaluate       Amount and/or Complexity of Data Reviewed  Clinical lab tests: reviewed  Tests in the radiology section of CPT®: reviewed        ED Course as of 08/03/22 1346   Wed Aug 03, 2022   1329 Patient with nonspecific colitis seen on imaging, suspicious of infectious given fever, patient's vital signs within normal limit, lab work unremarkable, white count not significantly elevated, will plan on outpatient antibiotics and strict return precautions with follow-up in GI [RN]      ED Course User Index  [RN] Jian Hernandez MD       Medications Administered       morphine injection 4 mg       Admin Date  08/03/2022 Action  Given Dose  4 mg Route  IntraVENous Administered By  Laurel Kamara RN              ondansetron [facility]) injection 4 mg       Admin Date  08/03/2022 Action  Given Dose  4 mg Route  IntraVENous Administered By  Laurel Kamara RN                    Procedures        Disposition: Discharge Note:  1:46 PM  The patient has been re-evaluated and is ready for discharge. Reviewed available results with patient. Counseled patient on diagnosis and care plan. Patient has expressed understanding, and all questions have been answered. Patient agrees with plan and agrees to follow up as recommended, or to return to the ED if their symptoms worsen. Discharge instructions have been provided and explained to the patient, along with reasons to return to the ED. DISCHARGE PLAN:  1. There are no discharge medications for this patient. 2.   Follow-up Information       Follow up With Specialties Details Why Contact Info    Alma Fischer MD Family Medicine Schedule an appointment as soon as possible for a visit in 1 week  [address]  [phone]      [facility] EMERGENCY DEPT Emergency Medicine  If symptoms worsen [address]  [address] [facility]  [phone]    [facility]  Schedule an appointment as soon as possible for a visit   [address]  [phone]          3. Return to ED if worse     Diagnosis     Clinical Impression:   1. Infectious colitis        Attestations:    Ely Garcia MD    Please note that this dictation was completed with [employer], the computer voice recognition software. Quite often unanticipated grammatical, syntax, homophones, and other interpretive errors are inadvertently transcribed by the computer software. Please disregard these errors. Please excuse any errors that have escaped final proofreading. Thank you.

## 2022-08-03 NOTE — Clinical Note
[address]  [facility] EMERGENCY DEPT  [address] [facility] [address] [phone]    Work/School Note    Date: 8/3/2022    To Whom It May concern:    Aquilino Higgins was seen and treated today in the emergency room by the following provider(s):  Attending Provider: Regis Ramos MD.      Aquilino Higgins is excused from work/school on 8/3/2022 through 8/5/2022. He is medically clear to return to work/school on 8/6/2022.          Sincerely,          Kobe Matta MD

## 2022-09-28 ENCOUNTER — OFFICE VISIT (OUTPATIENT)
Dept: FAMILY MEDICINE CLINIC | Age: 32
End: 2022-09-28

## 2022-09-28 VITALS
OXYGEN SATURATION: 97 % | WEIGHT: 142.4 LBS | RESPIRATION RATE: 16 BRPM | SYSTOLIC BLOOD PRESSURE: 124 MMHG | HEART RATE: 75 BPM | DIASTOLIC BLOOD PRESSURE: 81 MMHG | BODY MASS INDEX: 21.09 KG/M2 | HEIGHT: 69 IN

## 2022-09-28 DIAGNOSIS — F41.9 ANXIETY: ICD-10-CM

## 2022-09-28 DIAGNOSIS — S39.012A BACK STRAIN, INITIAL ENCOUNTER: Primary | ICD-10-CM

## 2022-09-28 PROCEDURE — 99204 OFFICE O/P NEW MOD 45 MIN: CPT | Performed by: FAMILY MEDICINE

## 2022-09-28 RX ORDER — SERTRALINE HYDROCHLORIDE 25 MG/1
25 TABLET, FILM COATED ORAL DAILY
Qty: 30 TABLET | Refills: 2 | Status: SHIPPED | OUTPATIENT
Start: 2022-09-28

## 2022-09-28 RX ORDER — METHOCARBAMOL 500 MG/1
500 TABLET, FILM COATED ORAL 3 TIMES DAILY
Qty: 60 TABLET | Refills: 2 | Status: SHIPPED | OUTPATIENT
Start: 2022-09-28

## 2022-09-28 NOTE — PROGRESS NOTES
Chief Complaint   Patient presents with    [address] Maintenance Due   Topic Date Due    Hepatitis C Screening  Never done    Depression Screen  Never done    COVID-19 Vaccine (1) Never done    Flu Vaccine (1) Never done       1. \"Have you been to the ER, urgent care clinic since your last visit? Hospitalized since your last visit? \"  Yes about a  month and a half ago for a viral bacteria infection in lower intestine at [facility]. 2. \"Have you seen or consulted any other health care providers outside of the [address] [place] [address] since your last visit? \" Previous PCP Dr. Gio Pfeiffer at [facility]  3. For patients aged 39-70: Has the patient had a colonoscopy / FIT/ Cologuard? NA - based on age      If the patient is female:    4. For patients aged 41-77: Has the patient had a mammogram within the past 2 years? NA - based on age or sex      11. For patients aged 21-65: Has the patient had a pap smear?  NA - based on age or sex

## 2022-09-28 NOTE — PROGRESS NOTES
Chief Complaint   Patient presents with    Establish Care     Pt is seen today to establish care. Pt was diagnosed with colitis about a month ago, was treated in the ER. Pt reports that acute symptoms resolved, but does struggle with frequent abdominal cramps, gas. Pt reports that it often occurs with anxiety, feels nauseated. Pt reports that he has anxiety , especially with eating, more so around other people. Pt has been under a lot of stress for a few years, knows it has affected ut health. Pt has been working on meditation and natural methods to help with anxiety and stress management. Pt was in counseling, was helping, but not quite enough. Subjective: (As above and below)     Chief Complaint   Patient presents with    Establish Care     he is a 27y.o. year old male who presents for evaluation. Reviewed PmHx, RxHx, FmHx, SocHx, AllgHx and updated in chart. Review of Systems - negative except as listed above    Objective:     Vitals:    09/28/22 1334 09/28/22 1346   BP: (!) 142/80 124/81   Pulse: 75    Resp: 16    SpO2: 97%    Weight: 142 lb 6.4 oz (64.6 kg)    Height: 5' 9\" (1.753 m)      Physical Examination: General appearance - alert, well appearing, and in no distress  Mental status - normal mood, behavior, speech, dress, motor activity, and thought processes  Ears - bilateral TM's and external ear canals normal  Chest - clear to auscultation, no wheezes, rales or rhonchi, symmetric air entry  Heart - normal rate, regular rhythm, normal S1, S2, no murmurs, rubs, clicks or gallops  Musculoskeletal - no joint tenderness, deformity or swelling  Extremities - peripheral pulses normal, no pedal edema, no clubbing or cyanosis    Assessment/ Plan:   1. Back strain, initial encounter  -use as needed  - methocarbamoL (ROBAXIN) 500 mg tablet; Take 1 Tablet by mouth three (3) times daily. Dispense: 60 Tablet; Refill: 2    2.  Anxiety  -Patient education: Side effects are common when starting SSRI therapy. Common side effects include headache, nausea, and diarrhea but these symptoms usually resolve after 2-3 weeks of therapy. Taking your SSRI with medication can help improve these side effects. There is also potential to experience a withdrawal syndrome with rapidly discontinuing SSRIs after 5 weeks of use. SSRI-withdrawal syndrome due to decreased amount of serotonin can result in dizziness, anxiety, nausea, sleep disturbance, and insomnia. - sertraline (ZOLOFT) 25 mg tablet; Take 1 Tablet by mouth daily. Dispense: 30 Tablet; Refill: 2  - METABOLIC PANEL, COMPREHENSIVE; Future  - CBC WITH AUTOMATED DIFF; Future  - TSH 3RD GENERATION; Future  - LIPID PANEL; Future  - HEPATITIS C AB; Future       I have discussed the diagnosis with the patient and the intended plan as seen in the above orders. The patient has received an after-visit summary and questions were answered concerning future plans.      Medication Side Effects and Warnings were discussed with patient: yes  Patient Labs were reviewed: yes  Patient Past Records were reviewed:  yes    Daniela López M.D.

## 2022-09-29 LAB
ALBUMIN SERPL-MCNC: 4.6 G/DL (ref 3.5–5)
ALBUMIN/GLOB SERPL: 1.3 {RATIO} (ref 1.1–2.2)
ALP SERPL-CCNC: 64 U/L (ref 45–117)
ALT SERPL-CCNC: 35 U/L (ref 12–78)
ANION GAP SERPL CALC-SCNC: 3 MMOL/L (ref 5–15)
AST SERPL-CCNC: 20 U/L (ref 15–37)
BASOPHILS # BLD: 0.1 K/UL (ref 0–0.1)
BASOPHILS NFR BLD: 2 % (ref 0–1)
BILIRUB SERPL-MCNC: 0.4 MG/DL (ref 0.2–1)
BUN SERPL-MCNC: 17 MG/DL (ref 6–20)
BUN/CREAT SERPL: 16 (ref 12–20)
CALCIUM SERPL-MCNC: 10.5 MG/DL (ref 8.5–10.1)
CHLORIDE SERPL-SCNC: 103 MMOL/L (ref 97–108)
CHOLEST SERPL-MCNC: 218 MG/DL
CO2 SERPL-SCNC: 30 MMOL/L (ref 21–32)
CREAT SERPL-MCNC: 1.08 MG/DL (ref 0.7–1.3)
DIFFERENTIAL METHOD BLD: ABNORMAL
EOSINOPHIL # BLD: 0.1 K/UL (ref 0–0.4)
EOSINOPHIL NFR BLD: 3 % (ref 0–7)
ERYTHROCYTE [DISTWIDTH] IN BLOOD BY AUTOMATED COUNT: 12.8 % (ref 11.5–14.5)
GLOBULIN SER CALC-MCNC: 3.6 G/DL (ref 2–4)
GLUCOSE SERPL-MCNC: 94 MG/DL (ref 65–100)
HCT VFR BLD AUTO: 47.5 % (ref 36.6–50.3)
HCV AB SERPL QL IA: NONREACTIVE
HDLC SERPL-MCNC: 77 MG/DL
HDLC SERPL: 2.8 {RATIO} (ref 0–5)
HGB BLD-MCNC: 15.5 G/DL (ref 12.1–17)
IMM GRANULOCYTES # BLD AUTO: 0 K/UL (ref 0–0.04)
IMM GRANULOCYTES NFR BLD AUTO: 0 % (ref 0–0.5)
LDLC SERPL CALC-MCNC: 121.6 MG/DL (ref 0–100)
LYMPHOCYTES # BLD: 1.7 K/UL (ref 0.8–3.5)
LYMPHOCYTES NFR BLD: 31 % (ref 12–49)
MCH RBC QN AUTO: 30.5 PG (ref 26–34)
MCHC RBC AUTO-ENTMCNC: 32.6 G/DL (ref 30–36.5)
MCV RBC AUTO: 93.3 FL (ref 80–99)
MONOCYTES # BLD: 0.4 K/UL (ref 0–1)
MONOCYTES NFR BLD: 7 % (ref 5–13)
NEUTS SEG # BLD: 3 K/UL (ref 1.8–8)
NEUTS SEG NFR BLD: 57 % (ref 32–75)
NRBC # BLD: 0 K/UL (ref 0–0.01)
NRBC BLD-RTO: 0 PER 100 WBC
PLATELET # BLD AUTO: 277 K/UL (ref 150–400)
PMV BLD AUTO: 11.2 FL (ref 8.9–12.9)
POTASSIUM SERPL-SCNC: 5.1 MMOL/L (ref 3.5–5.1)
PROT SERPL-MCNC: 8.2 G/DL (ref 6.4–8.2)
RBC # BLD AUTO: 5.09 M/UL (ref 4.1–5.7)
SODIUM SERPL-SCNC: 136 MMOL/L (ref 136–145)
TRIGL SERPL-MCNC: 97 MG/DL (ref ?–150)
TSH SERPL DL<=0.05 MIU/L-ACNC: 1.95 UIU/ML (ref 0.36–3.74)
VLDLC SERPL CALC-MCNC: 19.4 MG/DL
WBC # BLD AUTO: 5.3 K/UL (ref 4.1–11.1)

## 2022-10-04 DIAGNOSIS — E83.52 SERUM CALCIUM ELEVATED: Primary | ICD-10-CM

## 2022-10-04 NOTE — PROGRESS NOTES
Cholesterol is elevated, please closely monitor diet and increase exercise, recheck in 6 months. Calcium slightly elevated, will monitor, recheck lab only in 4-6 weeks. All other labs are within normal limits.    Please inform

## 2022-10-04 NOTE — PROGRESS NOTES
Spoke with pt and set up a lab for 6 weeks to re-check Calcium.  Could you add a lab order for [facility] please. :)

## 2022-11-15 ENCOUNTER — TELEPHONE (OUTPATIENT)
Dept: FAMILY MEDICINE CLINIC | Age: 32
End: 2022-11-15

## 2022-11-15 NOTE — TELEPHONE ENCOUNTER
----- Message from Antwan Raza Hadley sent at 11/14/2022  8:45 AM EST -----  Subject: Message to Provider    QUESTIONS  Information for Provider? please call patient to rs appt for lab draw   calcium recheck will be available this week 11-14 thru 11-16 or next week   in am  ---------------------------------------------------------------------------  --------------  [address] [employer]  [phone]; OK to leave message on voicemail,Do not leave any message,   patient will call back for answer  ---------------------------------------------------------------------------  --------------  SCRIPT ANSWERS  Relationship to Patient?  Self

## 2022-11-21 ENCOUNTER — TELEPHONE (OUTPATIENT)
Dept: FAMILY MEDICINE CLINIC | Age: 32
End: 2022-11-21

## 2022-11-21 NOTE — TELEPHONE ENCOUNTER
2 identifiers verified. Pt has been rescheduled for lab appointment.     ----- Message from Grazyna Pepper sent at 11/21/2022  3:48 PM EST -----  Subject: Message to Provider    QUESTIONS  Information for Provider? pt need to reschedule lab work that he missed   11/18. tried to call office-no answer. please call him to schedule  ---------------------------------------------------------------------------  --------------  Yvonne WOMACK  [phone]; OK to leave message on voicemail  ---------------------------------------------------------------------------  --------------  SCRIPT ANSWERS  Relationship to Patient?  Self

## 2022-11-22 ENCOUNTER — APPOINTMENT (OUTPATIENT)
Dept: FAMILY MEDICINE CLINIC | Age: 32
End: 2022-11-22

## 2022-11-22 DIAGNOSIS — E83.52 SERUM CALCIUM ELEVATED: ICD-10-CM

## 2022-11-23 LAB
ALBUMIN SERPL-MCNC: 4.4 G/DL (ref 3.5–5)
ALBUMIN/GLOB SERPL: 1.5 {RATIO} (ref 1.1–2.2)
ALP SERPL-CCNC: 57 U/L (ref 45–117)
ALT SERPL-CCNC: 33 U/L (ref 12–78)
ANION GAP SERPL CALC-SCNC: 5 MMOL/L (ref 5–15)
AST SERPL-CCNC: 20 U/L (ref 15–37)
BILIRUB SERPL-MCNC: 0.5 MG/DL (ref 0.2–1)
BUN SERPL-MCNC: 14 MG/DL (ref 6–20)
BUN/CREAT SERPL: 13 (ref 12–20)
CALCIUM SERPL-MCNC: 9.6 MG/DL (ref 8.5–10.1)
CALCIUM SERPL-MCNC: 9.8 MG/DL (ref 8.5–10.1)
CHLORIDE SERPL-SCNC: 103 MMOL/L (ref 97–108)
CO2 SERPL-SCNC: 31 MMOL/L (ref 21–32)
CREAT SERPL-MCNC: 1.12 MG/DL (ref 0.7–1.3)
GLOBULIN SER CALC-MCNC: 2.9 G/DL (ref 2–4)
GLUCOSE SERPL-MCNC: 88 MG/DL (ref 65–100)
POTASSIUM SERPL-SCNC: 4.1 MMOL/L (ref 3.5–5.1)
PROT SERPL-MCNC: 7.3 G/DL (ref 6.4–8.2)
PTH-INTACT SERPL-MCNC: 32.1 PG/ML (ref 18.4–88)
SODIUM SERPL-SCNC: 139 MMOL/L (ref 136–145)